# Patient Record
Sex: FEMALE | Race: WHITE
[De-identification: names, ages, dates, MRNs, and addresses within clinical notes are randomized per-mention and may not be internally consistent; named-entity substitution may affect disease eponyms.]

---

## 2019-04-05 ENCOUNTER — HOSPITAL ENCOUNTER (OUTPATIENT)
Dept: HOSPITAL 76 - LAB.WCP | Age: 54
Discharge: HOME | End: 2019-04-05
Attending: PHYSICIAN ASSISTANT
Payer: COMMERCIAL

## 2019-04-05 DIAGNOSIS — Z00.00: Primary | ICD-10-CM

## 2019-04-05 LAB
ALBUMIN DIAFP-MCNC: 4.3 G/DL (ref 3.2–5.5)
ALBUMIN/GLOB SERPL: 1.4 {RATIO} (ref 1–2.2)
ALP SERPL-CCNC: 47 IU/L (ref 42–121)
ALT SERPL W P-5'-P-CCNC: 19 IU/L (ref 10–60)
ANION GAP SERPL CALCULATED.4IONS-SCNC: 8 MMOL/L (ref 6–13)
AST SERPL W P-5'-P-CCNC: 20 IU/L (ref 10–42)
BASOPHILS NFR BLD AUTO: 0 10^3/UL (ref 0–0.1)
BASOPHILS NFR BLD AUTO: 0.7 %
BILIRUB BLD-MCNC: 0.8 MG/DL (ref 0.2–1)
BUN SERPL-MCNC: 16 MG/DL (ref 6–20)
CALCIUM UR-MCNC: 9 MG/DL (ref 8.5–10.3)
CHLORIDE SERPL-SCNC: 105 MMOL/L (ref 101–111)
CHOLEST SERPL-MCNC: 257 MG/DL
CO2 SERPL-SCNC: 27 MMOL/L (ref 21–32)
CREAT SERPLBLD-SCNC: 0.6 MG/DL (ref 0.4–1)
EOSINOPHIL # BLD AUTO: 0.1 10^3/UL (ref 0–0.7)
EOSINOPHIL NFR BLD AUTO: 2.5 %
ERYTHROCYTE [DISTWIDTH] IN BLOOD BY AUTOMATED COUNT: 14 % (ref 12–15)
EST. AVERAGE GLUCOSE BLD GHB EST-MCNC: 117 MG/DL (ref 70–100)
GFRSERPLBLD MDRD-ARVRAT: 105 ML/MIN/{1.73_M2} (ref 89–?)
GLOBULIN SER-MCNC: 3.1 G/DL (ref 2.1–4.2)
GLUCOSE SERPL-MCNC: 80 MG/DL (ref 70–100)
HB2 TOTAL: 14.3 G/DL
HBA1C BLD-MCNC: 0.56 G/DL
HDLC SERPL-MCNC: 72 MG/DL
HDLC SERPL: 3.6 {RATIO} (ref ?–4.4)
HEMOGLOBIN A1C %: 5.7 % (ref 4.6–6.2)
HGB UR QL STRIP: 13.6 G/DL (ref 12–16)
LDLC SERPL CALC-MCNC: 169 MG/DL
LDLC/HDLC SERPL: 2.3 {RATIO} (ref ?–4.4)
LYMPHOCYTES # SPEC AUTO: 1.6 10^3/UL (ref 1.5–3.5)
LYMPHOCYTES NFR BLD AUTO: 29.7 %
MCH RBC QN AUTO: 29.6 PG (ref 27–31)
MCHC RBC AUTO-ENTMCNC: 32.7 G/DL (ref 32–36)
MCV RBC AUTO: 90.5 FL (ref 81–99)
MONOCYTES # BLD AUTO: 0.5 10^3/UL (ref 0–1)
MONOCYTES NFR BLD AUTO: 8.9 %
NEUTROPHILS # BLD AUTO: 3.2 10^3/UL (ref 1.5–6.6)
NEUTROPHILS # SNV AUTO: 5.5 X10^3/UL (ref 4.8–10.8)
NEUTROPHILS NFR BLD AUTO: 58.2 %
PDW BLD AUTO: 8.3 FL (ref 7.9–10.8)
PLATELET # BLD: 238 10^3/UL (ref 130–450)
PROT SPEC-MCNC: 7.4 G/DL (ref 6.7–8.2)
RBC MAR: 4.61 10^6/UL (ref 4.2–5.4)
SODIUM SERPLBLD-SCNC: 140 MMOL/L (ref 135–145)
VLDLC SERPL-SCNC: 16 MG/DL

## 2019-04-05 PROCEDURE — 84443 ASSAY THYROID STIM HORMONE: CPT

## 2019-04-05 PROCEDURE — 80053 COMPREHEN METABOLIC PANEL: CPT

## 2019-04-05 PROCEDURE — 83036 HEMOGLOBIN GLYCOSYLATED A1C: CPT

## 2019-04-05 PROCEDURE — 80061 LIPID PANEL: CPT

## 2019-04-05 PROCEDURE — 85025 COMPLETE CBC W/AUTO DIFF WBC: CPT

## 2019-04-05 PROCEDURE — 83721 ASSAY OF BLOOD LIPOPROTEIN: CPT

## 2019-04-05 PROCEDURE — 36415 COLL VENOUS BLD VENIPUNCTURE: CPT

## 2020-10-03 ENCOUNTER — HOSPITAL ENCOUNTER (OUTPATIENT)
Dept: HOSPITAL 76 - EMS | Age: 55
Discharge: ANOTHER HOSPITAL | End: 2020-10-03
Attending: SURGERY
Payer: COMMERCIAL

## 2020-10-03 ENCOUNTER — HOSPITAL ENCOUNTER (EMERGENCY)
Dept: HOSPITAL 76 - ED | Age: 55
LOS: 1 days | Discharge: HOME | End: 2020-10-04
Payer: COMMERCIAL

## 2020-10-03 DIAGNOSIS — F41.9: Primary | ICD-10-CM

## 2020-10-03 DIAGNOSIS — R46.4: Primary | ICD-10-CM

## 2020-10-03 LAB
ALBUMIN DIAFP-MCNC: 4.6 G/DL (ref 3.2–5.5)
ALBUMIN/GLOB SERPL: 1.3 {RATIO} (ref 1–2.2)
ALP SERPL-CCNC: 47 IU/L (ref 42–121)
ALT SERPL W P-5'-P-CCNC: 12 IU/L (ref 10–60)
ANION GAP SERPL CALCULATED.4IONS-SCNC: 15 MMOL/L (ref 6–13)
APAP SERPL-MCNC: < 10 UG/ML (ref 10–30)
AST SERPL W P-5'-P-CCNC: 16 IU/L (ref 10–42)
BASOPHILS NFR BLD AUTO: 0 10^3/UL (ref 0–0.1)
BASOPHILS NFR BLD AUTO: 0.4 %
BILIRUB BLD-MCNC: 1.1 MG/DL (ref 0.2–1)
BUN SERPL-MCNC: 15 MG/DL (ref 6–20)
CALCIUM UR-MCNC: 9.4 MG/DL (ref 8.5–10.3)
CHLORIDE SERPL-SCNC: 102 MMOL/L (ref 101–111)
CO2 SERPL-SCNC: 22 MMOL/L (ref 21–32)
CREAT SERPLBLD-SCNC: 0.7 MG/DL (ref 0.4–1)
EOSINOPHIL # BLD AUTO: 0 10^3/UL (ref 0–0.7)
EOSINOPHIL NFR BLD AUTO: 0.1 %
ERYTHROCYTE [DISTWIDTH] IN BLOOD BY AUTOMATED COUNT: 13.5 % (ref 12–15)
GLOBULIN SER-MCNC: 3.6 G/DL (ref 2.1–4.2)
GLUCOSE SERPL-MCNC: 104 MG/DL (ref 70–100)
HGB UR QL STRIP: 13.3 G/DL (ref 12–16)
LIPASE SERPL-CCNC: 35 U/L (ref 22–51)
LYMPHOCYTES # SPEC AUTO: 1.2 10^3/UL (ref 1.5–3.5)
LYMPHOCYTES NFR BLD AUTO: 15.1 %
MCH RBC QN AUTO: 29.7 PG (ref 27–31)
MCHC RBC AUTO-ENTMCNC: 32.2 G/DL (ref 32–36)
MCV RBC AUTO: 92.2 FL (ref 81–99)
MONOCYTES # BLD AUTO: 0.6 10^3/UL (ref 0–1)
MONOCYTES NFR BLD AUTO: 7.1 %
NEUTROPHILS # BLD AUTO: 6 10^3/UL (ref 1.5–6.6)
NEUTROPHILS # SNV AUTO: 7.8 X10^3/UL (ref 4.8–10.8)
NEUTROPHILS NFR BLD AUTO: 77 %
PDW BLD AUTO: 9.7 FL (ref 7.9–10.8)
PLATELET # BLD: 301 10^3/UL (ref 130–450)
PROT SPEC-MCNC: 8.2 G/DL (ref 6.7–8.2)
RBC MAR: 4.48 10^6/UL (ref 4.2–5.4)
SALICYLATES SERPL-MCNC: < 6 MG/DL
SODIUM SERPLBLD-SCNC: 139 MMOL/L (ref 135–145)

## 2020-10-03 PROCEDURE — 36415 COLL VENOUS BLD VENIPUNCTURE: CPT

## 2020-10-03 PROCEDURE — 80329 ANALGESICS NON-OPIOID 1 OR 2: CPT

## 2020-10-03 PROCEDURE — 80307 DRUG TEST PRSMV CHEM ANLYZR: CPT

## 2020-10-03 PROCEDURE — 99283 EMERGENCY DEPT VISIT LOW MDM: CPT

## 2020-10-03 PROCEDURE — 80053 COMPREHEN METABOLIC PANEL: CPT

## 2020-10-03 PROCEDURE — 87086 URINE CULTURE/COLONY COUNT: CPT

## 2020-10-03 PROCEDURE — 81001 URINALYSIS AUTO W/SCOPE: CPT

## 2020-10-03 PROCEDURE — 85025 COMPLETE CBC W/AUTO DIFF WBC: CPT

## 2020-10-03 PROCEDURE — 80306 DRUG TEST PRSMV INSTRMNT: CPT

## 2020-10-03 PROCEDURE — 81003 URINALYSIS AUTO W/O SCOPE: CPT

## 2020-10-03 PROCEDURE — 81025 URINE PREGNANCY TEST: CPT

## 2020-10-03 PROCEDURE — 80320 DRUG SCREEN QUANTALCOHOLS: CPT

## 2020-10-03 PROCEDURE — 83690 ASSAY OF LIPASE: CPT

## 2020-10-03 PROCEDURE — 84443 ASSAY THYROID STIM HORMONE: CPT

## 2020-10-03 NOTE — ED PHYSICIAN DOCUMENTATION
History of Present Illness





- Stated complaint


Stated Complaint: RAN OUT OF MEDS





- Chief complaint


Chief Complaint: General





- History obtained from


History obtained from: Patient, EMS





- History of Present Illness


Timing: Unknown





- Additonal information


Additional information: 





BIBA. Medics report that someone called 911 because patient was sitting in her 

car for an inordinate amount of time. Patient reportedly provided no useful 

information to medics and she does not engage with me when I attempt HPI. She 

has intermittent eye contact, doesn't answer most of my questions but she does 

tell me "I'm having trouble making decisions today". She denies SI/HI/AH/VH when

I specifically ask about each of these individually.





Review of Systems


Cardiac: denies: Chest pain / pressure


Respiratory: denies: Dyspnea


GI: denies: Abdominal Pain


Psychiatric: denies: Suicidal, Homicidal, Hallucinations, Delusions





PD PAST MEDICAL HISTORY





- Past Medical History


Cardiovascular: None


Respiratory: None


Endocrine/Autoimmune: None


Psych: Depression





- Past Surgical History


Past Surgical History: No





- Present Medications


Home Medications: 


                                Ambulatory Orders











 Medication  Instructions  Recorded  Confirmed


 


LORazepam [Lorazepam] 0.5 mg PO DAILY 10/04/20 10/04/20


 


OLANZapine [Olanzapine] 5 mg PO DAILY 10/04/20 10/04/20


 


Sertraline HCl 50 mg PO DAILY 10/04/20 10/04/20


 


traZODone [Desyrel] 50 mg PO BID 10/04/20 10/04/20














- Allergies


Allergies/Adverse Reactions: 


                                    Allergies











Allergy/AdvReac Type Severity Reaction Status Date / Time


 


No Known Drug Allergies Allergy   Verified 09/15/13 12:28














- Social History


Does the pt smoke?: No


Smoking Status: Never smoker


Does the pt drink ETOH?: No


Does the pt have substance abuse?: No





- Immunizations


Immunizations are current?: Yes





- POLST


Patient has POLST: No





PD ED PE NORMAL





- Vitals


Vital signs reviewed: Yes





- General


General: Alert and oriented X 3 (she does answer orientation questions and is 

oriented x 3), No acute distress, Well developed/nourished





- HEENT


HEENT: PERRL, EOMI, Moist mucous membranes





- Neck


Neck: Supple, no meningeal sign





- Cardiac


Cardiac: RRR, No murmur





- Respiratory


Respiratory: No respiratory distress, Clear bilaterally





- Abdomen


Abdomen: Soft, Non tender





- Derm


Derm: Normal color, Warm and dry





- Neuro


Neuro: Alert and oriented X 3, CNs 2-12 intact, No motor deficit, No sensory 

deficit


Eye Opening: Spontaneous


Motor: Obeys Commands


Verbal: Oriented


GCS Score: 15





PD ED PE EXPANDED





- Psych


Psych: Withdrawn, Poor eye contact





Results





- Vitals


Vitals: 


                               Vital Signs - 24 hr











  10/03/20 10/04/20





  21:15 02:00


 


Temperature 36.6 C 


 


Heart Rate 74 65


 


Respiratory 16 16





Rate  


 


Blood Pressure 131/68 H 123/61


 


O2 Saturation 99 97








                                     Oxygen











O2 Source                      Room air

















- Labs


Labs: 


                                Laboratory Tests











  10/03/20 10/03/20 10/03/20





  22:31 22:31 22:31


 


WBC  7.8  


 


RBC  4.48  


 


Hgb  13.3  


 


Hct  41.3  


 


MCV  92.2  


 


MCH  29.7  


 


MCHC  32.2  


 


RDW  13.5  


 


Plt Count  301  


 


MPV  9.7  


 


Neut # (Auto)  6.0  


 


Lymph # (Auto)  1.2 L  


 


Mono # (Auto)  0.6  


 


Eos # (Auto)  0.0  


 


Baso # (Auto)  0.0  


 


Absolute Nucleated RBC  0.00  


 


Nucleated RBC %  0.0  


 


Sodium   139 


 


Potassium   3.9 


 


Chloride   102 


 


Carbon Dioxide   22 


 


Anion Gap   15.0 H 


 


BUN   15 


 


Creatinine   0.7 


 


Estimated GFR (MDRD)   88 L 


 


Glucose   104 H 


 


Calcium   9.4 


 


Total Bilirubin   1.1 H 


 


AST   16 


 


ALT   12 


 


Alkaline Phosphatase   47 


 


Total Protein   8.2 


 


Albumin   4.6 


 


Globulin   3.6 


 


Albumin/Globulin Ratio   1.3 


 


Lipase   35 


 


TSH    0.90


 


Urine Color   


 


Urine Clarity   


 


Urine pH   


 


Ur Specific Gravity   


 


Urine Protein   


 


Urine Glucose (UA)   


 


Urine Ketones   


 


Urine Occult Blood   


 


Urine Nitrite   


 


Urine Bilirubin   


 


Urine Urobilinogen   


 


Ur Leukocyte Esterase   


 


Urine RBC   


 


Urine WBC   


 


Ur Squamous Epith Cells   


 


Urine Bacteria   


 


Urine Mucus   


 


Ur Microscopic Review   


 


Urine Culture Comments   


 


Urine HCG, Qual   


 


Salicylates   < 6.0 


 


Urine Opiates Screen   


 


Ur Oxycodone Screen   


 


Urine Methadone Screen   


 


Ur Propoxyphene Screen   


 


Acetaminophen   < 10 L 


 


Ur Barbiturates Screen   


 


Ur Tricyclics Screen   


 


Ur Phencyclidine Scrn   


 


Ur Amphetamine Screen   


 


U Methamphetamines Scrn   


 


U Benzodiazepines Scrn   


 


Urine Cocaine Screen   


 


U Cannabinoids Screen   


 


Ethyl Alcohol   < 5.0 














  10/04/20





  00:30


 


WBC 


 


RBC 


 


Hgb 


 


Hct 


 


MCV 


 


MCH 


 


MCHC 


 


RDW 


 


Plt Count 


 


MPV 


 


Neut # (Auto) 


 


Lymph # (Auto) 


 


Mono # (Auto) 


 


Eos # (Auto) 


 


Baso # (Auto) 


 


Absolute Nucleated RBC 


 


Nucleated RBC % 


 


Sodium 


 


Potassium 


 


Chloride 


 


Carbon Dioxide 


 


Anion Gap 


 


BUN 


 


Creatinine 


 


Estimated GFR (MDRD) 


 


Glucose 


 


Calcium 


 


Total Bilirubin 


 


AST 


 


ALT 


 


Alkaline Phosphatase 


 


Total Protein 


 


Albumin 


 


Globulin 


 


Albumin/Globulin Ratio 


 


Lipase 


 


TSH 


 


Urine Color  DARK YELLOW


 


Urine Clarity  CLEAR


 


Urine pH  5.5


 


Ur Specific Gravity  >=1.030 H


 


Urine Protein  30 H


 


Urine Glucose (UA)  NEGATIVE


 


Urine Ketones  >=80 H


 


Urine Occult Blood  TRACE-INTA


 


Urine Nitrite  NEGATIVE


 


Urine Bilirubin  NEGATIVE


 


Urine Urobilinogen  0.2 (NORMAL)


 


Ur Leukocyte Esterase  NEGATIVE


 


Urine RBC  0-5


 


Urine WBC  0-3


 


Ur Squamous Epith Cells  MOD Squamous H


 


Urine Bacteria  Rare


 


Urine Mucus  Marked Strands


 


Ur Microscopic Review  INDICATED


 


Urine Culture Comments  NOT INDICATED


 


Urine HCG, Qual  NEGATIVE


 


Salicylates 


 


Urine Opiates Screen  NEGATIVE


 


Ur Oxycodone Screen  NEGATIVE


 


Urine Methadone Screen  NEGATIVE


 


Ur Propoxyphene Screen  NEGATIVE


 


Acetaminophen 


 


Ur Barbiturates Screen  NEGATIVE


 


Ur Tricyclics Screen  NEGATIVE


 


Ur Phencyclidine Scrn  NEGATIVE


 


Ur Amphetamine Screen  NEGATIVE


 


U Methamphetamines Scrn  NEGATIVE


 


U Benzodiazepines Scrn  NEGATIVE


 


Urine Cocaine Screen  NEGATIVE


 


U Cannabinoids Screen  NEGATIVE


 


Ethyl Alcohol 














PD MEDICAL DECISION MAKING





- ED course


Complexity details: reviewed old records, reviewed results, re-evaluated 

patient, considered differential, d/w patient


ED course: 





held in ED overnight for observation with plan to obtain SW consult in morning. 

She became more responsive during ED stay, was able to engage more and answer 

more questions. She was given doses of the medications found by querying her 

local pharmacy (indicates these were filled over a month ago with 30 day supply 

of each). I offered option of SW consult, discharge with rx for her medications,

 or consideration of inpatient stay at a mental health facility if she felt 

unsafe being discharged; I note she was transferred to [facility] when she was 

last in [facility] ED in August. Given these options, patient could not decide and 

thus I recommended she stay for SW consult in AM, and she was agreeable to this.

 At the end of my shift, in the morning but before SW consult was available, 

patient asked to be discharged. She now declines SW consult. Additionally, she 

insists she has her prescription medication at home and does not need new 

prescriptions written. She remains somewhat withdrawn but is awake, alert, and 

oriented x 3 and does not exhibit behavior that indicates imminent danger to 

self nor others and thus discharged as she requests





Departure





- Departure


Disposition: 01 Home, Self Care


Clinical Impression: 


 Anxiety





Condition: Good


Instructions:  ED Stress React


Discharge Date/Time: 10/04/20 08:09

## 2020-10-04 VITALS — DIASTOLIC BLOOD PRESSURE: 61 MMHG | SYSTOLIC BLOOD PRESSURE: 123 MMHG

## 2020-10-04 LAB
AMPHET UR QL SCN: NEGATIVE
BENZODIAZ UR QL SCN: NEGATIVE
BILIRUB UR QL CFM: NEGATIVE
CLARITY UR REFRACT.AUTO: CLEAR
COCAINE UR-SCNC: NEGATIVE UMOL/L
GLUCOSE UR QL STRIP.AUTO: NEGATIVE MG/DL
HCG UR QL: NEGATIVE
KETONES UR QL STRIP.AUTO: >=80 MG/DL
METHADONE UR QL SCN: NEGATIVE
METHAMPHET UR QL SCN: NEGATIVE
MUCOUS THREADS URNS QL MICRO: (no result)
NITRITE UR QL STRIP.AUTO: NEGATIVE
OPIATES UR QL SCN: NEGATIVE
PH UR STRIP.AUTO: 5.5 PH (ref 5–7.5)
PROT UR STRIP.AUTO-MCNC: 30 MG/DL
RBC # UR STRIP.AUTO: (no result) /UL
RBC # URNS HPF: (no result) /HPF (ref 0–5)
SP GR UR STRIP.AUTO: >=1.03 (ref 1–1.03)
SQUAMOUS URNS QL MICRO: (no result)
UROBILINOGEN UR QL STRIP.AUTO: (no result) E.U./DL
UROBILINOGEN UR STRIP.AUTO-MCNC: NEGATIVE MG/DL
VOLATILE DRUGS POS SERPL SCN: (no result)

## 2020-10-05 ENCOUNTER — HOSPITAL ENCOUNTER (OUTPATIENT)
Dept: HOSPITAL 76 - EMS | Age: 55
End: 2020-10-05
Attending: SURGERY
Payer: COMMERCIAL

## 2020-10-05 DIAGNOSIS — R41.0: Primary | ICD-10-CM
